# Patient Record
Sex: FEMALE | URBAN - NONMETROPOLITAN AREA
[De-identification: names, ages, dates, MRNs, and addresses within clinical notes are randomized per-mention and may not be internally consistent; named-entity substitution may affect disease eponyms.]

---

## 2023-08-23 ENCOUNTER — HOSPITAL ENCOUNTER (OUTPATIENT)
Age: 23
Discharge: HOME | End: 2023-08-23
Payer: COMMERCIAL

## 2023-08-23 ENCOUNTER — OFFICE VISIT (OUTPATIENT)
Dept: FAMILY MEDICINE CLINIC | Age: 23
End: 2023-08-23
Payer: COMMERCIAL

## 2023-08-23 VITALS
TEMPERATURE: 97.7 F | OXYGEN SATURATION: 98 % | SYSTOLIC BLOOD PRESSURE: 118 MMHG | RESPIRATION RATE: 16 BRPM | BODY MASS INDEX: 42.81 KG/M2 | WEIGHT: 266.4 LBS | HEART RATE: 82 BPM | HEIGHT: 66 IN | DIASTOLIC BLOOD PRESSURE: 82 MMHG

## 2023-08-23 DIAGNOSIS — Z86.2 HISTORY OF ANEMIA: ICD-10-CM

## 2023-08-23 DIAGNOSIS — Z86.59 HISTORY OF ADHD: ICD-10-CM

## 2023-08-23 DIAGNOSIS — Z91.030 ALLERGY TO BEE STING: ICD-10-CM

## 2023-08-23 DIAGNOSIS — N94.6 DYSMENORRHEA: Primary | ICD-10-CM

## 2023-08-23 DIAGNOSIS — L98.9 BACK SKIN LESION: ICD-10-CM

## 2023-08-23 DIAGNOSIS — Z30.011 ENCOUNTER FOR BCP (BIRTH CONTROL PILLS) INITIAL PRESCRIPTION: ICD-10-CM

## 2023-08-23 LAB
BASOPHILS # BLD: 0.05 K/UL (ref 0–0.2)
BASOPHILS NFR BLD: 1 % (ref 0–2)
EOSINOPHIL # BLD: 0.16 K/UL (ref 0–0.44)
EOSINOPHILS RELATIVE PERCENT: 2 % (ref 1–4)
ERYTHROCYTE [DISTWIDTH] IN BLOOD BY AUTOMATED COUNT: 13.3 % (ref 11.8–14.4)
HCG UR QL: NEGATIVE
HCT VFR BLD AUTO: 42.2 % (ref 36.3–47.1)
HGB BLD-MCNC: 13.7 G/DL (ref 11.9–15.1)
IMM GRANULOCYTES # BLD AUTO: 0.04 K/UL (ref 0–0.3)
IMM GRANULOCYTES NFR BLD: 0 %
IRON SATN MFR SERPL: 23 % (ref 20–55)
IRON SERPL-MCNC: 90 UG/DL (ref 37–145)
LYMPHOCYTES NFR BLD: 2.23 K/UL (ref 1.2–5.2)
LYMPHOCYTES RELATIVE PERCENT: 25 % (ref 25–45)
MCH RBC QN AUTO: 27.5 PG (ref 25.2–33.5)
MCHC RBC AUTO-ENTMCNC: 32.5 G/DL (ref 25.2–33.5)
MCV RBC AUTO: 84.6 FL (ref 82.6–102.9)
MONOCYTES NFR BLD: 0.7 K/UL (ref 0.1–1.4)
MONOCYTES NFR BLD: 8 % (ref 2–8)
NEUTROPHILS NFR BLD: 64 % (ref 34–64)
NEUTS SEG NFR BLD: 5.8 K/UL (ref 1.8–8)
NRBC BLD-RTO: 0 PER 100 WBC
PLATELET # BLD AUTO: 311 K/UL (ref 138–453)
PMV BLD AUTO: 10.3 FL (ref 8.1–13.5)
RBC # BLD AUTO: 4.99 M/UL (ref 3.95–5.11)
TIBC SERPL-MCNC: 390 UG/DL (ref 250–450)
UNSATURATED IRON BINDING CAPACITY: 300 UG/DL (ref 112–347)
WBC OTHER # BLD: 9 K/UL (ref 4.5–13.5)

## 2023-08-23 PROCEDURE — 36415 COLL VENOUS BLD VENIPUNCTURE: CPT

## 2023-08-23 PROCEDURE — 85025 COMPLETE CBC W/AUTO DIFF WBC: CPT

## 2023-08-23 PROCEDURE — 83550 IRON BINDING TEST: CPT

## 2023-08-23 PROCEDURE — 83540 ASSAY OF IRON: CPT

## 2023-08-23 PROCEDURE — 99214 OFFICE O/P EST MOD 30 MIN: CPT | Performed by: FAMILY MEDICINE

## 2023-08-23 PROCEDURE — 81025 URINE PREGNANCY TEST: CPT

## 2023-08-23 PROCEDURE — 81025 URINE PREGNANCY TEST: CPT | Performed by: FAMILY MEDICINE

## 2023-08-23 RX ORDER — EPINEPHRINE 0.3 MG/.3ML
INJECTION SUBCUTANEOUS
Qty: 2 EACH | Refills: 0 | Status: SHIPPED | OUTPATIENT
Start: 2023-08-23

## 2023-08-23 RX ORDER — CHOLECALCIFEROL (VITAMIN D3) 125 MCG
5 CAPSULE ORAL NIGHTLY PRN
COMMUNITY

## 2023-08-23 ASSESSMENT — ENCOUNTER SYMPTOMS
SHORTNESS OF BREATH: 0
COUGH: 0
BLOOD IN STOOL: 0

## 2023-08-23 ASSESSMENT — PATIENT HEALTH QUESTIONNAIRE - PHQ9
SUM OF ALL RESPONSES TO PHQ QUESTIONS 1-9: 0
1. LITTLE INTEREST OR PLEASURE IN DOING THINGS: 0
SUM OF ALL RESPONSES TO PHQ QUESTIONS 1-9: 0
2. FEELING DOWN, DEPRESSED OR HOPELESS: 0
SUM OF ALL RESPONSES TO PHQ9 QUESTIONS 1 & 2: 0
SUM OF ALL RESPONSES TO PHQ QUESTIONS 1-9: 0
SUM OF ALL RESPONSES TO PHQ QUESTIONS 1-9: 0

## 2023-08-23 NOTE — PROGRESS NOTES
lesion  -     mupirocin (BACTROBAN) 2 % ointment; Apply topically to affected areas twice daily as needed. , Disp-30 g, R-1, Normal  4. History of anemia  -     CBC with Auto Differential; Future  -     Iron and TIBC; Future  5. History of ADHD  -     External Referral To Psychiatry  6. Allergy to bee sting  -     EPINEPHrine (EPIPEN 2-BERTHA) 0.3 MG/0.3ML SOAJ injection; Use as directed as needed for allergic reaction, Disp-2 each, R-0Normal         Plan:      Return in about 3 months (around 11/23/2023) for well woman exam with Pap and f/u OCP's. Orders Placed This Encounter   Procedures    CBC with Auto Differential     Standing Status:   Future     Number of Occurrences:   1     Standing Expiration Date:   8/22/2024    Iron and TIBC     Standing Status:   Future     Number of Occurrences:   1     Standing Expiration Date:   8/23/2024     Order Specific Question:   Is Patient Fasting? Answer:   n/a     Order Specific Question:   No of Hours? Answer:   n/a    Pregnancy, Urine    External Referral To Psychiatry     Referral Priority:   Routine     Referral Type:   Eval and Treat     Referral Reason:   Specialty Services Required     Requested Specialty:   Psychiatry     Number of Visits Requested:   1     Orders Placed This Encounter   Medications    EPINEPHrine (EPIPEN 2-BERTHA) 0.3 MG/0.3ML SOAJ injection     Sig: Use as directed as needed for allergic reaction     Dispense:  2 each     Refill:  0    mupirocin (BACTROBAN) 2 % ointment     Sig: Apply topically to affected areas twice daily as needed. Dispense:  30 g     Refill:  1       Patient given educational materials - see patient instructions. Discussed use, benefit, and side effects of prescribed medications. All patient questions answered. Pt voiced understanding. Reviewed health maintenance.             Electronically signed by Lesli Beckman DO, DO on 8/30/2023 at 7:38 AM

## 2023-09-08 ENCOUNTER — PATIENT MESSAGE (OUTPATIENT)
Dept: FAMILY MEDICINE CLINIC | Age: 23
End: 2023-09-08

## 2023-09-08 NOTE — TELEPHONE ENCOUNTER
From: Jagjit Cagle  To: Dr. Garcia Pa: 9/8/2023 12:32 PM EDT  Subject: Birth control    I just wanted to let you know that I have started my period.